# Patient Record
(demographics unavailable — no encounter records)

---

## 2025-03-26 NOTE — HISTORY OF PRESENT ILLNESS
[FreeTextEntry1] : 68 yo  for gyn exam.   No PMB. NO change to bowel nor bladder fn. No HRT  Natural menopause abt 55 Menarche 13  Med: HTN - amlodipine  FH: Mother - HTN, DM, uterine cancer Father - HTN  SH: single, (D) gym 4 x weekly,  [facility] [employer],   [Mammogramdate] : 4/12/22 [TextBox_19] : BIRAD 1 fibroglandular [BoneDensityDate] : 3/26/24 [TextBox_37] : spine +4.7, left forearm -2.8 [ColonoscopyDate] : 2021

## 2025-03-26 NOTE — DISCUSSION/SUMMARY
[FreeTextEntry1] : Health Maintenance: -Pap with HPV neg '24 -Mammo Rx -TBSE -colonoscopy guidelines reviewed with patient. -Achieve Vit D levels of 30-40, intake of 1100 mg daily calcium mostly thru dark green leafy greens and milk products, exercise 30 minutes TIW

## 2025-03-26 NOTE — PHYSICAL EXAM
[Appropriately responsive] : appropriately responsive [Alert] : alert [No Acute Distress] : no acute distress [No Lymphadenopathy] : no lymphadenopathy [Soft] : soft [Non-tender] : non-tender [Non-distended] : non-distended [No HSM] : No HSM [No Lesions] : no lesions [No Mass] : no mass [Oriented x3] : oriented x3 [Examination Of The Breasts] : a normal appearance [No Masses] : no breast masses were palpable [Labia Majora] : normal [Labia Minora] : normal [Normal] : normal [Uterine Adnexae] : normal [FreeTextEntry9] :  Brent neg